# Patient Record
(demographics unavailable — no encounter records)

---

## 2025-03-25 NOTE — HISTORY OF PRESENT ILLNESS
[FreeTextEntry1] : New microscopic hematuria, patient is asymptomatic  Please refer to URO Consult Note.

## 2025-03-25 NOTE — LETTER BODY
[FreeTextEntry1] :  Rory Christie MD [address] [facility] [address], [place] [id number] [phone]  Dear Dr. Christie,  Reason for visit: Microscopic hematuria   This is a 59 year-old woman with microscopic hematuria referred for evaluation. Her urinalysis demonstrated evidence of microscopic hematuria. She denies any gross hematuria, dysuria or urinary incontinence. The patient denies any aggravating or relieving factors. The patient denies any interference of function. The patient is entirely asymptomatic. All other review of systems are negative. She has no cancer in her family medical history. She has no previous surgical history. Past medical history, family history and social history were inquired and were noncontributory to current condition. The patient does not use tobacco or drink alcohol. Medications and allergies were reviewed. She has no known allergies to medication.    On examination, the patient is a healthy-appearing woman in no acute distress. She is alert and oriented follows commands. She has normal mood and affect. She is normocephalic. Neck is supple. Oral no thrush. Respirations are unlabored. Abdomen is soft and nontender. Bladder is nonpalpable. No CVA tenderness. Neurologically she is grossly intact. No peripheral edema. Skin without gross abnormality.   Her urinalysis demonstrated evidence of microscopic hematuria.    Assessment: Microscopic hematuria.     I counseled the patient on the various etiologies of the microscopic hematuria. I discussed the risk of occult malignancy. I counseled the patient about microscopic hematuria. I recommended patient obtain urinalysis, urine cytology, cystoscopy, and renal ultrasound. I counseled the patient about the procedures. I answered the patient's questions. The risks and expected outcomes were discussed. The patient will follow up as directed and contact me with any questions or concerns. Thank you for the opportunity to participate in the care of Ms. CROSS. I will keep you updated on her progress.   Plan: Cystoscopy. Renal ultrasound. Urinalysis. Urine cytology.   Her cystoscopy today was negative for hematuria.  I personally reviewed ultrasound images with the patient today. Images demonstrated a 5 mm right renal stone. There are no solid masses or hydronephrosis visualized. I reassured the patient. She will follow-up in 6 months for renal ultrasound.   Plan: Follow up in 6 months for renal ultrasound.

## 2025-03-25 NOTE — ADDENDUM
[FreeTextEntry1] : Entered by Froylan Eduardo, acting as scribe for Dr. Bobby Shaw. The documentation recorded by the scribe accurately reflects the service I personally performed and the decisions made by me.

## 2025-03-25 NOTE — LETTER BODY
[FreeTextEntry1] :  Rory Christie MD [address] [facility] [address], [place] [id number] [phone]  Dear Dr. Christie,  Reason for visit: Microscopic hematuria   This is a 59 year-old woman with microscopic hematuria referred for evaluation. Her urinalysis demonstrated evidence of microscopic hematuria. She denies any gross hematuria, dysuria or urinary incontinence. The patient denies any aggravating or relieving factors. The patient denies any interference of function. The patient is entirely asymptomatic. All other review of systems are negative. She has no cancer in her family medical history. She has no previous surgical history. Past medical history, family history and social history were inquired and were noncontributory to current condition. The patient does not use tobacco or drink alcohol. Medications and allergies were reviewed. She has no known allergies to medication.    On examination, the patient is a healthy-appearing woman in no acute distress. She is alert and oriented follows commands. She has normal mood and affect. She is normocephalic. Neck is supple. Oral no thrush. Respirations are unlabored. Abdomen is soft and nontender. Bladder is nonpalpable. No CVA tenderness. Neurologically she is grossly intact. No peripheral edema. Skin without gross abnormality.   Her urinalysis demonstrated evidence of microscopic hematuria.    Assessment: Microscopic hematuria.     I counseled the patient on the various etiologies of the microscopic hematuria. I discussed the risk of occult malignancy. I counseled the patient about microscopic hematuria. I recommended patient obtain urinalysis, urine cytology, cystoscopy, and renal ultrasound. I counseled the patient about the procedures. I answered the patient's questions. The risks and expected outcomes were discussed. The patient will follow up as directed and contact me with any questions or concerns. Thank you for the opportunity to participate in the care of Ms. CROSS. I will keep you updated on her progress.   Plan: Cystoscopy. Renal ultrasound. Urinalysis. Urine cytology.   Her cystoscopy today was negative for hematuria.  I personally reviewed ultrasound images with the patient today. Images demonstrated a 5 mm right renal stone. There are no solid masses or hydronephrosis visualized. I reassured the patient. She will follow-up in 6 months for renal ultrasound.   Plan: Follow up in 6 months for renal ultrasound.